# Patient Record
Sex: FEMALE | Race: WHITE | NOT HISPANIC OR LATINO | URBAN - METROPOLITAN AREA
[De-identification: names, ages, dates, MRNs, and addresses within clinical notes are randomized per-mention and may not be internally consistent; named-entity substitution may affect disease eponyms.]

---

## 2018-02-20 ENCOUNTER — OFFICE VISIT CONVERTED (OUTPATIENT)
Dept: FAMILY MEDICINE CLINIC | Facility: CLINIC | Age: 68
End: 2018-02-20
Attending: FAMILY MEDICINE

## 2018-02-20 ENCOUNTER — CONVERSION ENCOUNTER (OUTPATIENT)
Dept: FAMILY MEDICINE CLINIC | Facility: CLINIC | Age: 68
End: 2018-02-20

## 2020-09-30 ENCOUNTER — OFFICE VISIT CONVERTED (OUTPATIENT)
Dept: FAMILY MEDICINE CLINIC | Facility: CLINIC | Age: 70
End: 2020-09-30
Attending: NURSE PRACTITIONER

## 2021-03-03 ENCOUNTER — HOSPITAL ENCOUNTER (OUTPATIENT)
Dept: VACCINE CLINIC | Facility: HOSPITAL | Age: 71
Discharge: HOME | End: 2021-03-03
Attending: INTERNAL MEDICINE

## 2021-03-24 ENCOUNTER — HOSPITAL ENCOUNTER (OUTPATIENT)
Dept: VACCINE CLINIC | Facility: HOSPITAL | Age: 71
Discharge: HOME | End: 2021-03-24
Attending: INTERNAL MEDICINE

## 2021-05-13 NOTE — PROGRESS NOTES
Progress Note      Patient Name: Zoey Cm   Patient ID: [id number]   Sex: Female   YOB: 1950    Primary Care Provider: Vangie Min MD    Visit Date: September 30, 2020    Provider: AURA [place]   [place] [facility] [place] [address] [place] [address], [address] [place] [address]  [id number]   Location Phone: [phone]          Chief Complaint  · Ear ache and headache   · cough and congestion      History Of Present Illness  Zoey Cm is a 70 year old /White female who presents for evaluation and treatment of:      70-year-old female who typically sees Dr. lopez comes in for an acute appointment.  Patient is complaining of 2-week history of cough, congestion, ear pain.  She states the only sick contact is her 9-month-old grandson he tested negative for COVID she states she has not been outside the house since February.  She is a current 3/4 pack/day smoker.  She denies any fever or chills.  She states the cough can be productive in nature with green mucus.  She denies any fatigue, nausea, vomiting.  No one else in the home is sick.  Blood pressure stable at 134/84 heart rate 93 she is afebrile at 98.3.       Past Medical History  Disease Name Date Onset Notes   Arthritis --  --    Arthritis, rheumatoid --  --    Cataract --  --    Glaucoma --  --    Night sweat --  --          Past Surgical History  Procedure Name Date Notes   Eye Implant --  yes   EYE SURGERY --  --    Hysterectomy --  --    Metal implants --  --          Medication List  Name Date Started Instructions   Keflex 500 mg oral capsule 02/20/2018 take 1 capsule (500 mg) by oral route every 12 hours for 10 days   Tessalon Perles 100 mg oral capsule 02/20/2018 take 1 capsule (100 mg) by oral route 3 times per day as needed for cough for 10 days         Allergy List  Allergen Name Date Reaction Notes   * Other --  --  ALL EYE DROPS         Family Medical History  Disease Name  Relative/Age Notes   Adopted - no noted history  --    *No Known Family History  --          Reproductive History  Menstrual   Last Menstrual Period: 1989   Pregnancy Summary   Total Pregnancies: 15 Full Term: 15 Premature: 0   Ab Induced: 0 Ab Spontaneous: 0 Ectopics: 0   Multiples: 0 Livin         Social History  Finding Status Start/Stop Quantity Notes   Alcohol Use Current some day --/-- --  rarely drinks, less than 1 drink per day, has been drinking for less than 1 year   lives with spouse --  --/-- --  --    . --  --/-- --  --    Recreational Drug Use Never --/-- --  no   Retired. --  --/-- --  --    Tobacco Current every day --/-- 1 PPD current every day smoker, 1 packs per day, smoked 31 or more years         Immunizations  NameDate Admin Mfg Trade Name Lot Number Route Inj VIS Given VIS Publication   Hepatitis A02019 MSD VAQTA-ADULT [id number] IM LD 2019    Comments: TOLERATED WELL STABLE CONDITION   Hepatitis A12018 SKB HAVRIX-ADULT [id number]  RD 2018   Comments: Pt tolerated well, left office in stable condition   Hjjlzlscg99/13/2018 [facility]C FLUZONE-HIGH DOSE [id number] IM RD 2018   Comments: Pt tolerated well, left office in stable condition   Sxcwylaki9013/13/2018 [facility] [place] PNEUMOVAX 23 [id number] IM LD 2018   Comments: Pt tolerated well,left office in stable condition         Review of Systems  · Constitutional  o Denies  o : fever, fatigue, weight loss, weight gain  · HENT  o Admits  o : headaches, sinus pain, nasal discharge, postnasal drip, ear pain  o Denies  o : vertigo, lightheadedness  · Cardiovascular  o Denies  o : lower extremity edema, claudication, chest pressure, palpitations  · Respiratory  o Admits  o : cough, productive cough  o Denies  o : shortness of breath, wheezing  · Gastrointestinal  o Denies  o : nausea, vomiting, diarrhea, constipation, abdominal pain      Physical Examination  · Constitutional  o Appearance  o :  well-nourished, well developed, in no acute distress  · Eyes  o Conjunctivae  o : conjunctivae normal, no exudates present  o Sclerae  o : sclerae white  o Pupils and Irises  o : pupils equal and round, and reactive to light and accomodation bilaterally  o Eyelids/Ocular Adnexae  o : extra ocular movements intact  · Ears, Nose, Mouth and Throat  o Ears  o :   § External Ears  § : external auditory canal appearance within normal limits, no discharge present  § Otoscopic Examination  § : Left TM, erythema, fluid present, right TM within normal limits  · Respiratory  o Respiratory Effort  o : breathing unlabored, no accessory muscle use  o Inspection of Chest  o : normal appearance, no retractions  o Auscultation of Lungs  o : normal breath sounds bilaterally  · Cardiovascular  o Heart  o :   § Auscultation of Heart  § : regular rate and rhythm, no murmurs, gallops or rubs  o Peripheral Vascular System  o :   § Extremities  § : no edema  · Neurologic  o Mental Status Examination  o :   § Orientation  § : alert and oriented x3  § Speech/Language  § : normal speech pattern  o Gait and Station  o : normal gait, able to stand without difficulty              Assessment  · Cough     786.2/R05  · Upper respiratory infection     465.9/J06.9  Patient looks well, nontoxic, no underlying respiratory issues. We will send over azithromycin, Mucinex, Tessalon Perles discussed smoking sensation. Patient verbalized understanding is agreeable treatment plan.      Plan  · Orders  o ACO-39: Current medications updated and reviewed (, 1159F) - - 09/30/2020  · Medications  o benzonatate 200 mg oral capsule   SIG: take 1 capsule (200 mg) by oral route 3 times per day as needed for cough for 10 days   DISP: (30) Capsule with 0 refills  Prescribed on 09/30/2020     o Mucinex 600 mg oral tablet extended release 12hr   SIG: take 1 tablet (600 mg) by oral route every 12 hours as needed for 10 days   DISP: (20) Tablet with 0  refills  Prescribed on 09/30/2020     o azithromycin 250 mg oral tablet   SIG: take 2 tablets (500 mg) by oral route once daily for 1 day then 1 tablet (250 mg) by oral route once daily for 4 days   DISP: (6) Tablet with 0 refills  Prescribed on 09/30/2020     · Instructions  o Patient was educated/instructed on their diagnosis, treatment and medications prior to discharge from the clinic today.  · Disposition  o Call or Return if symptoms worsen or persist.  o follow up as needed  o call the office with any questions or concerns            Electronically Signed by: AURA Norwood -Author on September 30, 2020 02:01:58 PM

## 2021-05-16 VITALS
SYSTOLIC BLOOD PRESSURE: 91 MMHG | OXYGEN SATURATION: 99 % | HEIGHT: 64 IN | TEMPERATURE: 98.5 F | HEART RATE: 88 BPM | RESPIRATION RATE: 16 BRPM | DIASTOLIC BLOOD PRESSURE: 62 MMHG | BODY MASS INDEX: 19.81 KG/M2 | WEIGHT: 116 LBS